# Patient Record
Sex: MALE | URBAN - METROPOLITAN AREA
[De-identification: names, ages, dates, MRNs, and addresses within clinical notes are randomized per-mention and may not be internally consistent; named-entity substitution may affect disease eponyms.]

---

## 2022-11-07 ENCOUNTER — OFFICE VISIT (OUTPATIENT)
Dept: URBAN - METROPOLITAN AREA CLINIC 128 | Facility: CLINIC | Age: 47
End: 2022-11-07
Payer: COMMERCIAL

## 2022-11-07 ENCOUNTER — WEB ENCOUNTER (OUTPATIENT)
Dept: URBAN - METROPOLITAN AREA CLINIC 128 | Facility: CLINIC | Age: 47
End: 2022-11-07

## 2022-11-07 VITALS
SYSTOLIC BLOOD PRESSURE: 148 MMHG | TEMPERATURE: 98.1 F | WEIGHT: 226 LBS | DIASTOLIC BLOOD PRESSURE: 80 MMHG | HEIGHT: 74 IN | HEART RATE: 68 BPM | BODY MASS INDEX: 29 KG/M2

## 2022-11-07 DIAGNOSIS — R03.0 ELEVATED BLOOD PRESSURE READING: ICD-10-CM

## 2022-11-07 DIAGNOSIS — K21.9 GERD: ICD-10-CM

## 2022-11-07 DIAGNOSIS — Z12.11 COLON CANCER SCREENING: ICD-10-CM

## 2022-11-07 DIAGNOSIS — R10.13 EPIGASTRIC PAIN: ICD-10-CM

## 2022-11-07 PROCEDURE — 99204 OFFICE O/P NEW MOD 45 MIN: CPT | Performed by: PHYSICIAN ASSISTANT

## 2022-11-07 RX ORDER — SUCRALFATE 1 G/1
AS DIRECTED TABLET ORAL TWICE A DAY
Status: ACTIVE | COMMUNITY

## 2022-11-07 RX ORDER — FAMOTIDINE 20 MG/1
1 TABLET AT BEDTIME AS NEEDED TABLET, FILM COATED ORAL ONCE A DAY
Status: ACTIVE | COMMUNITY

## 2022-11-07 RX ORDER — PANTOPRAZOLE SODIUM 40 MG/1
1 TABLET TABLET, DELAYED RELEASE ORAL ONCE A DAY
Status: ACTIVE | COMMUNITY

## 2022-11-07 NOTE — HPI-OTHER HISTORIES
The patient is a new patient elicits the following symptoms:  GERD symptoms Duration of symptoms: x 6 weeks Location of symptoms: epigastric abdominal pain Associated symptoms:  Prior over the counter or prescription medications: symptoms were refractory to pantoprazole. famotidine and sucralfate were added recently Current stress: yes Any recent weight changes: none Any recent changes in diet: none He drinks ETOH socially weekly. He takes Ibuprophen weekly. Any past history of gastric/esophageal ulcers or Barretts esophagus: none Previous work up- labs,imaging: none Last EGD: never Last colonoscopy: never Patient denies a family history of colon polyps or colon cancer.

## 2022-11-08 PROBLEM — 235595009 GASTROESOPHAGEAL REFLUX DISEASE: Status: ACTIVE | Noted: 2022-11-07

## 2022-11-10 LAB
A/G RATIO: 1.8
ABSOLUTE BASOPHILS: 68
ABSOLUTE EOSINOPHILS: 88
ABSOLUTE LYMPHOCYTES: 1822
ABSOLUTE MONOCYTES: 571
ABSOLUTE NEUTROPHILS: 4250
ALBUMIN: 4.4
ALKALINE PHOSPHATASE: 61
ALT (SGPT): 24
AST (SGOT): 28
BASOPHILS: 1
BILIRUBIN, TOTAL: 0.5
BUN/CREATININE RATIO: (no result)
BUN: 19
CALCIUM: 9.6
CARBON DIOXIDE, TOTAL: 26
CHLORIDE: 104
CREATININE: 0.95
EGFR: 99
EOSINOPHILS: 1.3
GLOBULIN, TOTAL: 2.4
GLUCOSE: 93
H PYLORI BREATH TEST: NOT DETECTED
H. PYLORI BREATH TEST: NOT DETECTED
HEMATOCRIT: 42.2
HEMOGLOBIN: 14.3
IMMUNOGLOBULIN A: 131
INTERPRETATION: (no result)
INTERPRETATION: NOT DETECTED
LIPASE: 28
LYMPHOCYTES: 26.8
MCH: 30.6
MCHC: 33.9
MCV: 90.4
MONOCYTES: 8.4
MPV: 9.9
NEUTROPHILS: 62.5
PLATELET COUNT: 282
POTASSIUM: 4.3
PROTEIN, TOTAL: 6.8
RDW: 11.9
RED BLOOD CELL COUNT: 4.67
SODIUM: 140
TISSUE TRANSGLUTAMINASE AB, IGA: <1
WHITE BLOOD CELL COUNT: 6.8

## 2022-11-17 ENCOUNTER — LAB OUTSIDE AN ENCOUNTER (OUTPATIENT)
Dept: URBAN - METROPOLITAN AREA CLINIC 128 | Facility: CLINIC | Age: 47
End: 2022-11-17

## 2022-12-15 ENCOUNTER — CLAIMS CREATED FROM THE CLAIM WINDOW (OUTPATIENT)
Dept: URBAN - METROPOLITAN AREA CLINIC 4 | Facility: CLINIC | Age: 47
End: 2022-12-15
Payer: COMMERCIAL

## 2022-12-15 ENCOUNTER — OFFICE VISIT (OUTPATIENT)
Dept: URBAN - METROPOLITAN AREA SURGERY CENTER 31 | Facility: SURGERY CENTER | Age: 47
End: 2022-12-15

## 2022-12-15 ENCOUNTER — CLAIMS CREATED FROM THE CLAIM WINDOW (OUTPATIENT)
Dept: URBAN - METROPOLITAN AREA SURGERY CENTER 31 | Facility: SURGERY CENTER | Age: 47
End: 2022-12-15
Payer: COMMERCIAL

## 2022-12-15 DIAGNOSIS — D12.5 ADENOMA OF SIGMOID COLON: ICD-10-CM

## 2022-12-15 DIAGNOSIS — K63.89 BACTERIAL OVERGROWTH SYNDROME: ICD-10-CM

## 2022-12-15 DIAGNOSIS — K21.9 ACID REFLUX: ICD-10-CM

## 2022-12-15 DIAGNOSIS — K31.89 OTHER DISEASES OF STOMACH AND DUODENUM: ICD-10-CM

## 2022-12-15 DIAGNOSIS — Z12.11 COLON CANCER SCREENING: ICD-10-CM

## 2022-12-15 PROCEDURE — G8907 PT DOC NO EVENTS ON DISCHARG: HCPCS | Performed by: INTERNAL MEDICINE

## 2022-12-15 PROCEDURE — 45380 COLONOSCOPY AND BIOPSY: CPT | Performed by: INTERNAL MEDICINE

## 2022-12-15 PROCEDURE — 88305 TISSUE EXAM BY PATHOLOGIST: CPT | Performed by: PATHOLOGY

## 2022-12-15 PROCEDURE — 45385 COLONOSCOPY W/LESION REMOVAL: CPT | Performed by: INTERNAL MEDICINE

## 2022-12-15 PROCEDURE — 43239 EGD BIOPSY SINGLE/MULTIPLE: CPT | Performed by: INTERNAL MEDICINE

## 2022-12-15 RX ORDER — FAMOTIDINE 20 MG/1
1 TABLET AT BEDTIME AS NEEDED TABLET, FILM COATED ORAL ONCE A DAY
Status: ACTIVE | COMMUNITY

## 2022-12-15 RX ORDER — PANTOPRAZOLE SODIUM 40 MG/1
1 TABLET TABLET, DELAYED RELEASE ORAL ONCE A DAY
Status: ACTIVE | COMMUNITY

## 2022-12-15 RX ORDER — SUCRALFATE 1 G/1
AS DIRECTED TABLET ORAL TWICE A DAY
Status: ACTIVE | COMMUNITY

## 2023-08-08 ENCOUNTER — DASHBOARD ENCOUNTERS (OUTPATIENT)
Age: 48
End: 2023-08-08

## 2023-08-08 ENCOUNTER — OFFICE VISIT (OUTPATIENT)
Dept: URBAN - METROPOLITAN AREA CLINIC 128 | Facility: CLINIC | Age: 48
End: 2023-08-08
Payer: COMMERCIAL

## 2023-08-08 VITALS
HEIGHT: 74 IN | SYSTOLIC BLOOD PRESSURE: 136 MMHG | DIASTOLIC BLOOD PRESSURE: 70 MMHG | BODY MASS INDEX: 27.05 KG/M2 | HEART RATE: 65 BPM | TEMPERATURE: 98.1 F | WEIGHT: 210.8 LBS

## 2023-08-08 DIAGNOSIS — Z86.010 PERSONAL HISTORY OF COLONIC POLYPS: ICD-10-CM

## 2023-08-08 DIAGNOSIS — R10.11 RUQ ABDOMINAL PAIN: ICD-10-CM

## 2023-08-08 DIAGNOSIS — K21.9 GERD: ICD-10-CM

## 2023-08-08 DIAGNOSIS — K76.0 FATTY LIVER: ICD-10-CM

## 2023-08-08 PROBLEM — 197321007: Status: ACTIVE | Noted: 2023-08-08

## 2023-08-08 PROBLEM — 428283002: Status: ACTIVE | Noted: 2023-08-08

## 2023-08-08 PROCEDURE — 99214 OFFICE O/P EST MOD 30 MIN: CPT | Performed by: INTERNAL MEDICINE

## 2023-08-08 RX ORDER — PANTOPRAZOLE SODIUM 40 MG/1
1 TABLET TABLET, DELAYED RELEASE ORAL ONCE A DAY
Status: ACTIVE | COMMUNITY

## 2023-08-08 RX ORDER — SUCRALFATE 1 G/1
AS DIRECTED TABLET ORAL TWICE A DAY
Status: ACTIVE | COMMUNITY

## 2023-08-08 RX ORDER — FAMOTIDINE 20 MG/1
1 TABLET AT BEDTIME AS NEEDED TABLET, FILM COATED ORAL ONCE A DAY
Status: ACTIVE | COMMUNITY

## 2023-08-08 NOTE — PHYSICAL EXAM GASTROINTESTINAL
Abdomen , soft, tenderness to palpation in the RUQ, nondistended , no guarding or rigidity , no masses palpable , normal bowel sounds, negative Gilmore's sign, negative psoas and obturators signs, negative CVA tenderness bilaterally Liver and Spleen , no hepatosplenomegaly Rectal deferred

## 2023-08-08 NOTE — HPI-OTHER HISTORIES
The patient is here for a follow up visit for GERD symptoms. He is on pantoprazole, famotidine, and sucralfate and doing well on this regimen. Duration of symptoms: x 1 year Location of symptoms: RUQ abdominal pain Current stress: yes Any recent weight changes: none Any recent changes in diet: none He drinks ETOH socially weekly. He takes Ibuprophen weekly. Any past history of gastric/esophageal ulcers or Barretts esophagus: none Previous work up- labs,imaging: none Last EGD: 12/22 revealed reflux esophagitis Last colonoscopy: 12/22 revealed tubular adenomas. The biopsy of the atypical tissue adjacent to the polyp also revealed adenomatous tissue indication there is a larger sessile polyp in the area that still needs to be removed. A 6 month repeat colonoscopy was advised but he has not had this repeated yet. Patient denies a family history of colon polyps or colon cancer. RUQ US 2022 revealed fatty liver Normal labs noted in 2022

## 2023-08-09 ENCOUNTER — TELEPHONE ENCOUNTER (OUTPATIENT)
Dept: URBAN - METROPOLITAN AREA CLINIC 128 | Facility: CLINIC | Age: 48
End: 2023-08-09

## 2023-08-29 ENCOUNTER — LAB OUTSIDE AN ENCOUNTER (OUTPATIENT)
Dept: URBAN - METROPOLITAN AREA CLINIC 128 | Facility: CLINIC | Age: 48
End: 2023-08-29

## 2023-09-11 ENCOUNTER — OFFICE VISIT (OUTPATIENT)
Dept: URBAN - METROPOLITAN AREA SURGERY CENTER 31 | Facility: SURGERY CENTER | Age: 48
End: 2023-09-11

## 2023-10-10 ENCOUNTER — OFFICE VISIT (OUTPATIENT)
Dept: URBAN - METROPOLITAN AREA CLINIC 128 | Facility: CLINIC | Age: 48
End: 2023-10-10

## 2025-06-25 ENCOUNTER — OFFICE VISIT (OUTPATIENT)
Dept: URBAN - METROPOLITAN AREA CLINIC 128 | Facility: CLINIC | Age: 50
End: 2025-06-25
Payer: COMMERCIAL

## 2025-06-25 DIAGNOSIS — K62.5 RECTAL BLEEDING: ICD-10-CM

## 2025-06-25 DIAGNOSIS — K64.9 HEMORRHOID: ICD-10-CM

## 2025-06-25 PROCEDURE — 99213 OFFICE O/P EST LOW 20 MIN: CPT | Performed by: PHYSICIAN ASSISTANT

## 2025-06-25 RX ORDER — POLYETHYLENE GLYCOL 3350, SODIUM SULFATE ANHYDROUS, SODIUM BICARBONATE, SODIUM CHLORIDE, POTASSIUM CHLORIDE 236; 22.74; 6.74; 5.86; 2.97 G/4L; G/4L; G/4L; G/4L; G/4L
AS DIRECTED POWDER, FOR SOLUTION ORAL ONCE A DAY
Qty: 1 BOTTLE | Refills: 0 | OUTPATIENT
Start: 2025-06-25 | End: 2025-06-26

## 2025-06-25 RX ORDER — SUCRALFATE 1 G/1
AS DIRECTED TABLET ORAL TWICE A DAY
Status: ACTIVE | COMMUNITY

## 2025-06-25 RX ORDER — FAMOTIDINE 20 MG/1
1 TABLET AT BEDTIME AS NEEDED TABLET, FILM COATED ORAL ONCE A DAY
Status: ACTIVE | COMMUNITY

## 2025-06-25 RX ORDER — HYDROCORTISONE 25 MG/G
1 APPLICATION CREAM TOPICAL TWICE A DAY
Qty: 1 | Refills: 0 | OUTPATIENT
Start: 2025-06-25 | End: 2025-07-09

## 2025-06-25 RX ORDER — LISINOPRIL 5 MG/1
2 TABLETS TABLET ORAL ONCE A DAY
Status: ACTIVE | COMMUNITY

## 2025-06-25 RX ORDER — ROSUVASTATIN CALCIUM 5 MG/1
1 TABLET TABLET, COATED ORAL ONCE A DAY
Status: ACTIVE | COMMUNITY

## 2025-06-25 RX ORDER — PANTOPRAZOLE SODIUM 40 MG/1
1 TABLET TABLET, DELAYED RELEASE ORAL ONCE A DAY
Status: ACTIVE | COMMUNITY

## 2025-06-25 NOTE — PHYSICAL EXAM GASTROINTESTINAL
Abdomen , soft, nontender, nondistended , no guarding or rigidity , no masses palpable , normal bowel sounds , Liver and Spleen , no hepatomegaly present Rectal  , normal sphincter tone, no external hemorrhoid noted, non-bleeding internal hemorrhoid noted, no rectal masses or bleeding present. Rectal examination was performed with a chaperone present

## 2025-06-25 NOTE — HPI-OTHER HISTORIES
The patient presents today due to the following symptoms: bright red blood per rectum that lasted for 10 hours in the commode every time he had a bowel movement yesterday Duration of symptoms: x 1 day Associated symptoms: none. Denies constipation What alleviates symptoms: nothing What aggravates symptoms: nothing There is no family history of colon polyps or colon cancer. Patient denies any heart, lung, or kidney problems.  Patient denies any blood thinners or oxygen therapy. Denies any dialysis. Denies any pacemaker or defibrillator.  He has a personal history of colon polyps.  Last EGD: 12/22 revealed reflux esophagitis Last colonoscopy: 12/22 revealed tubular adenomas. The biopsy of the atypical tissue adjacent to the polyp also revealed adenomatous tissue indication there is a larger sessile polyp in the area that still needs to be removed. A 6 month repeat colonoscopy was advised but he has not had this repeated yet. Patient denies a family history of colon polyps or colon cancer. RUQ US 2022 revealed fatty liver. Normal labs noted in 2022 HIs GERD is well controlled on pantoprazole and famotidine.   2025 EGD and colonoscopy revealed: A) Duodenum, Second Part (D2), Biopsy: NO SIGNIFICANT ABNORMALITY. (B) Stomach, Antrum, Biopsy: NO SIGNIFICANT ABNORMALITY. (C) Gastroesophageal Junction, Biopsy: SQUAMOCOLUMNAR MUCOSA WITH REFLUX-TYPE CHANGES. No Evidence of Germain's Esophagus or Eosinophilic Esophagitis. Negative for Infectious organisms, Dysplasia or Malignancy. (D) Colon, Sigmoid, Polypectomy: TUBULAR ADENOMA(S). (E) Colon, Sigmoid, Biopsy: TUBULAR ADENOMA(S). to be repeated in 6 months but he never did so he is overdue now.

## 2025-06-26 ENCOUNTER — CLAIMS CREATED FROM THE CLAIM WINDOW (OUTPATIENT)
Dept: URBAN - METROPOLITAN AREA CLINIC 4 | Facility: CLINIC | Age: 50
End: 2025-06-26
Payer: COMMERCIAL

## 2025-06-26 ENCOUNTER — OFFICE VISIT (OUTPATIENT)
Dept: URBAN - METROPOLITAN AREA SURGERY CENTER 31 | Facility: SURGERY CENTER | Age: 50
End: 2025-06-26

## 2025-06-26 DIAGNOSIS — K55.059 ACUTE (REVERSIBLE) ISCHEMIA OF INTESTINE, PART AND EXTENT UNSPECIFIED: ICD-10-CM

## 2025-06-26 PROCEDURE — 88305 TISSUE EXAM BY PATHOLOGIST: CPT | Performed by: PATHOLOGY

## 2025-06-26 RX ORDER — HYDROCORTISONE 25 MG/G
1 APPLICATION CREAM TOPICAL TWICE A DAY
Qty: 1 | Refills: 0 | Status: ACTIVE | COMMUNITY
Start: 2025-06-25 | End: 2025-07-09

## 2025-06-26 RX ORDER — SUCRALFATE 1 G/1
AS DIRECTED TABLET ORAL TWICE A DAY
Status: ACTIVE | COMMUNITY

## 2025-06-26 RX ORDER — ROSUVASTATIN CALCIUM 5 MG/1
1 TABLET TABLET, COATED ORAL ONCE A DAY
Status: ACTIVE | COMMUNITY

## 2025-06-26 RX ORDER — LISINOPRIL 5 MG/1
2 TABLETS TABLET ORAL ONCE A DAY
Status: ACTIVE | COMMUNITY

## 2025-06-26 RX ORDER — FAMOTIDINE 20 MG/1
1 TABLET AT BEDTIME AS NEEDED TABLET, FILM COATED ORAL ONCE A DAY
Status: ACTIVE | COMMUNITY

## 2025-06-26 RX ORDER — POLYETHYLENE GLYCOL 3350, SODIUM SULFATE ANHYDROUS, SODIUM BICARBONATE, SODIUM CHLORIDE, POTASSIUM CHLORIDE 236; 22.74; 6.74; 5.86; 2.97 G/4L; G/4L; G/4L; G/4L; G/4L
AS DIRECTED POWDER, FOR SOLUTION ORAL ONCE A DAY
Qty: 1 BOTTLE | Refills: 0 | Status: ACTIVE | COMMUNITY
Start: 2025-06-25 | End: 2025-06-26

## 2025-06-26 RX ORDER — PANTOPRAZOLE SODIUM 40 MG/1
1 TABLET TABLET, DELAYED RELEASE ORAL ONCE A DAY
Status: ACTIVE | COMMUNITY

## 2025-07-18 ENCOUNTER — TELEPHONE ENCOUNTER (OUTPATIENT)
Dept: URBAN - METROPOLITAN AREA CLINIC 128 | Facility: CLINIC | Age: 50
End: 2025-07-18

## 2025-07-23 ENCOUNTER — OFFICE VISIT (OUTPATIENT)
Dept: URBAN - METROPOLITAN AREA CLINIC 128 | Facility: CLINIC | Age: 50
End: 2025-07-23

## 2025-07-24 ENCOUNTER — OFFICE VISIT (OUTPATIENT)
Dept: URBAN - METROPOLITAN AREA CLINIC 128 | Facility: CLINIC | Age: 50
End: 2025-07-24